# Patient Record
Sex: MALE | Race: WHITE | NOT HISPANIC OR LATINO | Employment: OTHER | URBAN - METROPOLITAN AREA
[De-identification: names, ages, dates, MRNs, and addresses within clinical notes are randomized per-mention and may not be internally consistent; named-entity substitution may affect disease eponyms.]

---

## 2017-01-05 ENCOUNTER — AMBULATORY - HEALTHEAST (OUTPATIENT)
Dept: INTERNAL MEDICINE | Facility: CLINIC | Age: 67
End: 2017-01-05

## 2017-01-05 DIAGNOSIS — B99.9 INFECTION: ICD-10-CM

## 2017-06-29 ENCOUNTER — AMBULATORY - HEALTHEAST (OUTPATIENT)
Dept: INTERNAL MEDICINE | Facility: CLINIC | Age: 67
End: 2017-06-29

## 2017-08-29 ENCOUNTER — AMBULATORY - HEALTHEAST (OUTPATIENT)
Dept: INTERNAL MEDICINE | Facility: CLINIC | Age: 67
End: 2017-08-29

## 2017-08-29 DIAGNOSIS — M54.9 BACK PAIN: ICD-10-CM

## 2017-09-25 ENCOUNTER — HOSPITAL ENCOUNTER (OUTPATIENT)
Dept: PHYSICAL MEDICINE AND REHAB | Facility: CLINIC | Age: 67
Discharge: HOME | End: 2017-09-25
Attending: PHYSICAL MEDICINE & REHABILITATION

## 2017-09-25 DIAGNOSIS — M54.16 LUMBAR RADICULAR PAIN: ICD-10-CM

## 2017-09-25 DIAGNOSIS — M79.18 MYOFASCIAL PAIN: ICD-10-CM

## 2017-09-25 DIAGNOSIS — M54.50 LUMBAR SPINE PAIN: ICD-10-CM

## 2017-09-28 ENCOUNTER — COMMUNICATION - HEALTHEAST (OUTPATIENT)
Dept: PHYSICAL MEDICINE AND REHAB | Facility: CLINIC | Age: 67
End: 2017-09-28

## 2017-09-28 DIAGNOSIS — M54.50 LUMBAR SPINE PAIN: ICD-10-CM

## 2017-10-02 ENCOUNTER — RECORDS - HEALTHEAST (OUTPATIENT)
Dept: ADMINISTRATIVE | Facility: OTHER | Age: 67
End: 2017-10-02

## 2017-10-04 ENCOUNTER — OFFICE VISIT - HEALTHEAST (OUTPATIENT)
Dept: PHYSICAL THERAPY | Facility: CLINIC | Age: 67
End: 2017-10-04

## 2017-10-04 DIAGNOSIS — M54.50 LUMBAR SPINE PAIN: ICD-10-CM

## 2017-10-09 ENCOUNTER — OFFICE VISIT - HEALTHEAST (OUTPATIENT)
Dept: PHYSICAL THERAPY | Facility: CLINIC | Age: 67
End: 2017-10-09

## 2017-10-09 DIAGNOSIS — M54.50 LUMBAR SPINE PAIN: ICD-10-CM

## 2017-10-12 ENCOUNTER — COMMUNICATION - HEALTHEAST (OUTPATIENT)
Dept: PHYSICAL MEDICINE AND REHAB | Facility: CLINIC | Age: 67
End: 2017-10-12

## 2017-10-16 ENCOUNTER — OFFICE VISIT - HEALTHEAST (OUTPATIENT)
Dept: PHYSICAL THERAPY | Facility: CLINIC | Age: 67
End: 2017-10-16

## 2017-10-16 DIAGNOSIS — M54.50 LUMBAR SPINE PAIN: ICD-10-CM

## 2017-10-20 ENCOUNTER — AMBULATORY - HEALTHEAST (OUTPATIENT)
Dept: INTERNAL MEDICINE | Facility: CLINIC | Age: 67
End: 2017-10-20

## 2017-10-20 DIAGNOSIS — G43.909 MIGRAINE: ICD-10-CM

## 2017-10-24 ENCOUNTER — AMBULATORY - HEALTHEAST (OUTPATIENT)
Dept: INTERNAL MEDICINE | Facility: CLINIC | Age: 67
End: 2017-10-24

## 2017-12-29 ENCOUNTER — AMBULATORY - HEALTHEAST (OUTPATIENT)
Dept: INTERNAL MEDICINE | Facility: CLINIC | Age: 67
End: 2017-12-29

## 2017-12-29 ENCOUNTER — AMBULATORY - HEALTHEAST (OUTPATIENT)
Dept: NURSING | Facility: CLINIC | Age: 67
End: 2017-12-29

## 2017-12-29 DIAGNOSIS — Z23 NEED FOR TDAP VACCINATION: ICD-10-CM

## 2018-02-19 ENCOUNTER — AMBULATORY - HEALTHEAST (OUTPATIENT)
Dept: INTERNAL MEDICINE | Facility: CLINIC | Age: 68
End: 2018-02-19

## 2018-02-19 DIAGNOSIS — L30.9 DERMATITIS: ICD-10-CM

## 2018-02-19 DIAGNOSIS — J11.1 INFLUENZA: ICD-10-CM

## 2018-03-22 ENCOUNTER — AMBULATORY - HEALTHEAST (OUTPATIENT)
Dept: INTERNAL MEDICINE | Facility: CLINIC | Age: 68
End: 2018-03-22

## 2018-03-22 DIAGNOSIS — E55.9 VITAMIN D DEFICIENCY: ICD-10-CM

## 2018-03-22 DIAGNOSIS — Z00.00 PREVENTATIVE HEALTH CARE: ICD-10-CM

## 2018-03-22 DIAGNOSIS — Z12.5 PROSTATE CANCER SCREENING: ICD-10-CM

## 2018-03-22 DIAGNOSIS — E78.00 PURE HYPERCHOLESTEROLEMIA: ICD-10-CM

## 2018-03-26 ENCOUNTER — COMMUNICATION - HEALTHEAST (OUTPATIENT)
Dept: TELEHEALTH | Facility: CLINIC | Age: 68
End: 2018-03-26

## 2018-03-26 ENCOUNTER — AMBULATORY - HEALTHEAST (OUTPATIENT)
Dept: LAB | Facility: CLINIC | Age: 68
End: 2018-03-26

## 2018-03-26 DIAGNOSIS — E78.00 PURE HYPERCHOLESTEROLEMIA: ICD-10-CM

## 2018-03-26 DIAGNOSIS — E55.9 VITAMIN D DEFICIENCY: ICD-10-CM

## 2018-03-26 DIAGNOSIS — Z12.5 PROSTATE CANCER SCREENING: ICD-10-CM

## 2018-03-26 LAB
ALBUMIN SERPL-MCNC: 4 G/DL (ref 3.5–5)
ALP SERPL-CCNC: 67 U/L (ref 45–120)
ALT SERPL W P-5'-P-CCNC: 26 U/L (ref 0–45)
ANION GAP SERPL CALCULATED.3IONS-SCNC: 11 MMOL/L (ref 5–18)
AST SERPL W P-5'-P-CCNC: 28 U/L (ref 0–40)
BILIRUB SERPL-MCNC: 0.7 MG/DL (ref 0–1)
BUN SERPL-MCNC: 24 MG/DL (ref 8–22)
CALCIUM SERPL-MCNC: 9.4 MG/DL (ref 8.5–10.5)
CHLORIDE BLD-SCNC: 105 MMOL/L (ref 98–107)
CHOLEST SERPL-MCNC: 188 MG/DL
CO2 SERPL-SCNC: 26 MMOL/L (ref 22–31)
CREAT SERPL-MCNC: 0.84 MG/DL (ref 0.7–1.3)
ERYTHROCYTE [DISTWIDTH] IN BLOOD BY AUTOMATED COUNT: 11.9 % (ref 11–14.5)
FASTING STATUS PATIENT QL REPORTED: YES
GFR SERPL CREATININE-BSD FRML MDRD: >60 ML/MIN/1.73M2
GLUCOSE BLD-MCNC: 105 MG/DL (ref 70–125)
HCT VFR BLD AUTO: 46.3 % (ref 40–54)
HDLC SERPL-MCNC: 83 MG/DL
HGB BLD-MCNC: 15.9 G/DL (ref 14–18)
LDLC SERPL CALC-MCNC: 92 MG/DL
MCH RBC QN AUTO: 31.3 PG (ref 27–34)
MCHC RBC AUTO-ENTMCNC: 34.4 G/DL (ref 32–36)
MCV RBC AUTO: 91 FL (ref 80–100)
PLATELET # BLD AUTO: 211 THOU/UL (ref 140–440)
PMV BLD AUTO: 7.7 FL (ref 7–10)
POTASSIUM BLD-SCNC: 4.8 MMOL/L (ref 3.5–5)
PROT SERPL-MCNC: 6.9 G/DL (ref 6–8)
PSA SERPL-MCNC: 2.5 NG/ML (ref 0–4.5)
RBC # BLD AUTO: 5.09 MILL/UL (ref 4.4–6.2)
SODIUM SERPL-SCNC: 142 MMOL/L (ref 136–145)
TRIGL SERPL-MCNC: 66 MG/DL
TSH SERPL DL<=0.005 MIU/L-ACNC: 4.06 UIU/ML (ref 0.3–5)
WBC: 4.6 THOU/UL (ref 4–11)

## 2018-03-27 LAB
25(OH)D3 SERPL-MCNC: 44.7 NG/ML (ref 30–80)
25(OH)D3 SERPL-MCNC: 44.7 NG/ML (ref 30–80)

## 2018-04-25 ENCOUNTER — AMBULATORY - HEALTHEAST (OUTPATIENT)
Dept: NURSING | Facility: CLINIC | Age: 68
End: 2018-04-25

## 2018-04-25 ENCOUNTER — AMBULATORY - HEALTHEAST (OUTPATIENT)
Dept: INTERNAL MEDICINE | Facility: CLINIC | Age: 68
End: 2018-04-25

## 2018-05-08 ENCOUNTER — AMBULATORY - HEALTHEAST (OUTPATIENT)
Dept: INTERNAL MEDICINE | Facility: CLINIC | Age: 68
End: 2018-05-08

## 2018-05-08 ENCOUNTER — AMBULATORY - HEALTHEAST (OUTPATIENT)
Dept: NURSING | Facility: CLINIC | Age: 68
End: 2018-05-08

## 2018-05-08 DIAGNOSIS — Z23 NEED FOR PNEUMOCOCCAL VACCINE: ICD-10-CM

## 2018-05-30 ENCOUNTER — OFFICE VISIT - HEALTHEAST (OUTPATIENT)
Dept: PHYSICAL THERAPY | Facility: CLINIC | Age: 68
End: 2018-05-30

## 2018-05-30 DIAGNOSIS — M54.50 LUMBAR SPINE PAIN: ICD-10-CM

## 2018-06-11 ENCOUNTER — OFFICE VISIT - HEALTHEAST (OUTPATIENT)
Dept: PHYSICAL THERAPY | Facility: CLINIC | Age: 68
End: 2018-06-11

## 2018-06-11 DIAGNOSIS — M54.50 LUMBAR SPINE PAIN: ICD-10-CM

## 2018-06-13 ENCOUNTER — OFFICE VISIT - HEALTHEAST (OUTPATIENT)
Dept: PHYSICAL THERAPY | Facility: CLINIC | Age: 68
End: 2018-06-13

## 2018-06-13 DIAGNOSIS — M54.50 LUMBAR SPINE PAIN: ICD-10-CM

## 2018-06-18 ENCOUNTER — OFFICE VISIT - HEALTHEAST (OUTPATIENT)
Dept: PHYSICAL THERAPY | Facility: CLINIC | Age: 68
End: 2018-06-18

## 2018-06-18 DIAGNOSIS — M54.50 LUMBAR SPINE PAIN: ICD-10-CM

## 2018-06-26 ENCOUNTER — AMBULATORY - HEALTHEAST (OUTPATIENT)
Dept: NURSING | Facility: CLINIC | Age: 68
End: 2018-06-26

## 2018-06-26 DIAGNOSIS — Z23 NEED FOR SHINGLES VACCINE: ICD-10-CM

## 2018-07-02 ENCOUNTER — AMBULATORY - HEALTHEAST (OUTPATIENT)
Dept: INTERNAL MEDICINE | Facility: CLINIC | Age: 68
End: 2018-07-02

## 2018-07-02 DIAGNOSIS — E78.5 HYPERLIPEMIA: ICD-10-CM

## 2018-07-25 ENCOUNTER — AMBULATORY - HEALTHEAST (OUTPATIENT)
Dept: INTERNAL MEDICINE | Facility: CLINIC | Age: 68
End: 2018-07-25

## 2018-07-25 DIAGNOSIS — M21.42 FLAT FEET, BILATERAL: ICD-10-CM

## 2018-07-25 DIAGNOSIS — M21.41 FLAT FEET, BILATERAL: ICD-10-CM

## 2018-12-04 ENCOUNTER — AMBULATORY - HEALTHEAST (OUTPATIENT)
Dept: INTERNAL MEDICINE | Facility: CLINIC | Age: 68
End: 2018-12-04

## 2018-12-04 DIAGNOSIS — L30.9 DERMATITIS: ICD-10-CM

## 2018-12-04 DIAGNOSIS — E78.5 HYPERLIPEMIA: ICD-10-CM

## 2018-12-05 ENCOUNTER — AMBULATORY - HEALTHEAST (OUTPATIENT)
Dept: INTERNAL MEDICINE | Facility: CLINIC | Age: 68
End: 2018-12-05

## 2018-12-06 ENCOUNTER — COMMUNICATION - HEALTHEAST (OUTPATIENT)
Dept: INTERNAL MEDICINE | Facility: CLINIC | Age: 68
End: 2018-12-06

## 2019-03-19 ENCOUNTER — RECORDS - HEALTHEAST (OUTPATIENT)
Dept: ADMINISTRATIVE | Facility: OTHER | Age: 69
End: 2019-03-19

## 2019-07-17 ENCOUNTER — COMMUNICATION - HEALTHEAST (OUTPATIENT)
Dept: INTERNAL MEDICINE | Facility: CLINIC | Age: 69
End: 2019-07-17

## 2019-07-25 ENCOUNTER — OFFICE VISIT - HEALTHEAST (OUTPATIENT)
Dept: INTERNAL MEDICINE | Facility: CLINIC | Age: 69
End: 2019-07-25

## 2019-07-25 DIAGNOSIS — Z12.5 PROSTATE CANCER SCREENING: ICD-10-CM

## 2019-07-25 DIAGNOSIS — Z00.00 PREVENTATIVE HEALTH CARE: ICD-10-CM

## 2019-07-25 DIAGNOSIS — E78.00 PURE HYPERCHOLESTEROLEMIA: ICD-10-CM

## 2019-07-25 LAB
ALBUMIN SERPL-MCNC: 4.6 G/DL (ref 3.5–5)
ALP SERPL-CCNC: 64 U/L (ref 45–120)
ALT SERPL W P-5'-P-CCNC: 26 U/L (ref 0–45)
ANION GAP SERPL CALCULATED.3IONS-SCNC: 13 MMOL/L (ref 5–18)
AST SERPL W P-5'-P-CCNC: 29 U/L (ref 0–40)
BILIRUB SERPL-MCNC: 0.7 MG/DL (ref 0–1)
BUN SERPL-MCNC: 19 MG/DL (ref 8–22)
CALCIUM SERPL-MCNC: 10.3 MG/DL (ref 8.5–10.5)
CHLORIDE BLD-SCNC: 106 MMOL/L (ref 98–107)
CHOLEST SERPL-MCNC: 206 MG/DL
CO2 SERPL-SCNC: 26 MMOL/L (ref 22–31)
CREAT SERPL-MCNC: 0.96 MG/DL (ref 0.7–1.3)
FASTING STATUS PATIENT QL REPORTED: YES
GFR SERPL CREATININE-BSD FRML MDRD: >60 ML/MIN/1.73M2
GLUCOSE BLD-MCNC: 115 MG/DL (ref 70–125)
HDLC SERPL-MCNC: 94 MG/DL
LDLC SERPL CALC-MCNC: 102 MG/DL
POTASSIUM BLD-SCNC: 4.9 MMOL/L (ref 3.5–5)
PROT SERPL-MCNC: 7.3 G/DL (ref 6–8)
PSA SERPL-MCNC: 1.2 NG/ML (ref 0–4.5)
SODIUM SERPL-SCNC: 145 MMOL/L (ref 136–145)
TRIGL SERPL-MCNC: 50 MG/DL

## 2019-07-25 ASSESSMENT — MIFFLIN-ST. JEOR: SCORE: 1765.32

## 2019-11-29 ENCOUNTER — AMBULATORY - HEALTHEAST (OUTPATIENT)
Dept: FAMILY MEDICINE | Facility: CLINIC | Age: 69
End: 2019-11-29

## 2019-11-29 DIAGNOSIS — R05.9 COUGH: ICD-10-CM

## 2019-12-04 ENCOUNTER — RECORDS - HEALTHEAST (OUTPATIENT)
Dept: ADMINISTRATIVE | Facility: OTHER | Age: 69
End: 2019-12-04

## 2020-01-04 ENCOUNTER — AMBULATORY - HEALTHEAST (OUTPATIENT)
Dept: FAMILY MEDICINE | Facility: CLINIC | Age: 70
End: 2020-01-04

## 2020-01-04 DIAGNOSIS — J10.1 INFLUENZA A: ICD-10-CM

## 2020-01-26 ENCOUNTER — AMBULATORY - HEALTHEAST (OUTPATIENT)
Dept: FAMILY MEDICINE | Facility: CLINIC | Age: 70
End: 2020-01-26

## 2020-01-26 DIAGNOSIS — L30.9 DERMATITIS: ICD-10-CM

## 2020-01-26 RX ORDER — TRIAMCINOLONE ACETONIDE 5 MG/G
OINTMENT TOPICAL
Qty: 60 G | Refills: 3 | Status: SHIPPED | OUTPATIENT
Start: 2020-01-26

## 2020-02-11 ENCOUNTER — AMBULATORY - HEALTHEAST (OUTPATIENT)
Dept: FAMILY MEDICINE | Facility: CLINIC | Age: 70
End: 2020-02-11

## 2020-02-11 DIAGNOSIS — J10.1 INFLUENZA A: ICD-10-CM

## 2020-09-12 ENCOUNTER — AMBULATORY - HEALTHEAST (OUTPATIENT)
Dept: INTERNAL MEDICINE | Facility: CLINIC | Age: 70
End: 2020-09-12

## 2020-09-12 DIAGNOSIS — Z11.59 NEED FOR HEPATITIS C SCREENING TEST: ICD-10-CM

## 2020-09-12 DIAGNOSIS — Z12.5 PROSTATE CANCER SCREENING: ICD-10-CM

## 2020-09-12 DIAGNOSIS — E78.00 PURE HYPERCHOLESTEROLEMIA: ICD-10-CM

## 2020-09-12 DIAGNOSIS — E55.9 VITAMIN D DEFICIENCY: ICD-10-CM

## 2020-09-16 ENCOUNTER — AMBULATORY - HEALTHEAST (OUTPATIENT)
Dept: LAB | Facility: CLINIC | Age: 70
End: 2020-09-16

## 2020-09-16 DIAGNOSIS — Z11.59 NEED FOR HEPATITIS C SCREENING TEST: ICD-10-CM

## 2020-09-16 DIAGNOSIS — Z12.5 PROSTATE CANCER SCREENING: ICD-10-CM

## 2020-09-16 DIAGNOSIS — E55.9 VITAMIN D DEFICIENCY: ICD-10-CM

## 2020-09-16 DIAGNOSIS — E78.00 PURE HYPERCHOLESTEROLEMIA: ICD-10-CM

## 2020-09-16 LAB
ALBUMIN SERPL-MCNC: 4.6 G/DL (ref 3.5–5)
ALP SERPL-CCNC: 70 U/L (ref 45–120)
ALT SERPL W P-5'-P-CCNC: 32 U/L (ref 0–45)
ANION GAP SERPL CALCULATED.3IONS-SCNC: 12 MMOL/L (ref 5–18)
AST SERPL W P-5'-P-CCNC: 30 U/L (ref 0–40)
BILIRUB SERPL-MCNC: 1 MG/DL (ref 0–1)
BUN SERPL-MCNC: 19 MG/DL (ref 8–22)
CALCIUM SERPL-MCNC: 9.8 MG/DL (ref 8.5–10.5)
CHLORIDE BLD-SCNC: 106 MMOL/L (ref 98–107)
CHOLEST SERPL-MCNC: 223 MG/DL
CO2 SERPL-SCNC: 25 MMOL/L (ref 22–31)
CREAT SERPL-MCNC: 0.96 MG/DL (ref 0.7–1.3)
FASTING STATUS PATIENT QL REPORTED: YES
GFR SERPL CREATININE-BSD FRML MDRD: >60 ML/MIN/1.73M2
GLUCOSE BLD-MCNC: 98 MG/DL (ref 70–125)
HCV AB SERPL QL IA: NEGATIVE
HDLC SERPL-MCNC: 106 MG/DL
LDLC SERPL CALC-MCNC: 107 MG/DL
POTASSIUM BLD-SCNC: 4.8 MMOL/L (ref 3.5–5)
PROT SERPL-MCNC: 7.5 G/DL (ref 6–8)
PSA SERPL-MCNC: 1.8 NG/ML (ref 0–4.5)
SODIUM SERPL-SCNC: 143 MMOL/L (ref 136–145)
TRIGL SERPL-MCNC: 50 MG/DL

## 2020-09-17 LAB
25(OH)D3 SERPL-MCNC: 33.5 NG/ML (ref 30–80)
25(OH)D3 SERPL-MCNC: 33.5 NG/ML (ref 30–80)

## 2020-10-15 ENCOUNTER — RECORDS - HEALTHEAST (OUTPATIENT)
Dept: ADMINISTRATIVE | Facility: OTHER | Age: 70
End: 2020-10-15

## 2020-11-16 ENCOUNTER — AMBULATORY - HEALTHEAST (OUTPATIENT)
Dept: INTERNAL MEDICINE | Facility: CLINIC | Age: 70
End: 2020-11-16

## 2020-11-16 DIAGNOSIS — E78.00 PURE HYPERCHOLESTEROLEMIA: ICD-10-CM

## 2020-12-23 ENCOUNTER — AMBULATORY - HEALTHEAST (OUTPATIENT)
Dept: INTERNAL MEDICINE | Facility: CLINIC | Age: 70
End: 2020-12-23

## 2020-12-23 DIAGNOSIS — E78.00 PURE HYPERCHOLESTEROLEMIA: ICD-10-CM

## 2020-12-23 DIAGNOSIS — E55.9 VITAMIN D DEFICIENCY: ICD-10-CM

## 2020-12-23 RX ORDER — ATORVASTATIN CALCIUM 40 MG/1
40 TABLET, FILM COATED ORAL AT BEDTIME
Qty: 90 TABLET | Refills: 3 | Status: SHIPPED | OUTPATIENT
Start: 2020-12-23 | End: 2021-11-29

## 2021-05-25 ENCOUNTER — RECORDS - HEALTHEAST (OUTPATIENT)
Dept: ADMINISTRATIVE | Facility: CLINIC | Age: 71
End: 2021-05-25

## 2021-05-28 ENCOUNTER — RECORDS - HEALTHEAST (OUTPATIENT)
Dept: ADMINISTRATIVE | Facility: CLINIC | Age: 71
End: 2021-05-28

## 2021-05-29 ENCOUNTER — RECORDS - HEALTHEAST (OUTPATIENT)
Dept: ADMINISTRATIVE | Facility: CLINIC | Age: 71
End: 2021-05-29

## 2021-05-30 NOTE — TELEPHONE ENCOUNTER
New Appointment Needed  What is the reason for the visit:    cholesterol blood and medication check, patient has not had an appointment in the last 3 years and that makes the patient new again. Patient is wondering if  Dr. Lockwood will still be willing to see them.  Provider Preference: PCP only  How soon do you need to be seen?: as available, patient said that there is no rush  Waitlist offered?: No  Okay to leave a detailed message:  Yes

## 2021-05-30 NOTE — PROGRESS NOTES
ASSESSMENT:  1. Pure hypercholesterolemia  Occasional myalgias but none recently.  Refill Lipitor.  Check labs.  Discussed aspirin usage  - Lipid Cascade  - Comprehensive Metabolic Panel  - atorvastatin (LIPITOR) 40 MG tablet; Take 1 tablet (40 mg total) by mouth at bedtime.  Dispense: 90 tablet; Refill: 3    2. Prostate cancer screening  Routine update  - PSA (Prostatic-Specific Antigen), Annual Screen    3. Preventative health care  Discussed HIV and hepatitis C which he defers.  Update Pneumovax.  Tdap up-to-date.:  Up-to-date  - Pneumococcal polysaccharide vaccine 23-valent greater than or equal to 1yo subcutaneous/IM      Otherwise doing very well    PLAN:  Patient Instructions   Update pneumovax    Update labs    Refill lipitor    Shots up to date          Orders Placed This Encounter   Procedures     Pneumococcal polysaccharide vaccine 23-valent greater than or equal to 1yo subcutaneous/IM     Lipid Cascade     Order Specific Question:   Fasting is required?     Answer:   Unknown     Comprehensive Metabolic Panel     PSA (Prostatic-Specific Antigen), Annual Screen     Medications Discontinued During This Encounter   Medication Reason     atorvastatin (LIPITOR) 40 MG tablet        Return in about 1 year (around 7/25/2020) for Next scheduled follow up.    CHIEF COMPLAINT:  Chief Complaint   Patient presents with     Medication Management       HISTORY OF PRESENT ILLNESS:  Mike is a 68 y.o. male presenting to the clinic today for medication review.  He takes atorvastatin for primary cardiac prevention.  He occasionally will feel muscle aches but has not felt any for the last several months.  He denies chest pain or shortness of breath    Preventive care screening is reviewed.  He reports up-to-date with colonoscopy.  No prostate symptoms.  New recommendations for HIV discussed and he defers.  He is not interested in hepatitis C screening.  Liver function tests have been normal in the past    REVIEW OF SYSTEMS:  "  No chest pain or shortness of breath.  Occasional myalgias.  Good energy.  Good mood.  All other systems are negative.    PFSH:  Retired in March from [place] [facility].  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  Expecting third grandchild    TOBACCO USE:  Social History     Tobacco Use   Smoking Status Never Smoker   Smokeless Tobacco Never Used       VITALS:  Vitals:    07/25/19 0912   BP: 118/74   Patient Site: Left Arm   Patient Position: Sitting   Cuff Size: Adult Regular   Pulse: 66   SpO2: 98%   Weight: 202 lb 4.8 oz (91.8 kg)   Height: 6' 2.5\" (1.892 m)     Wt Readings from Last 3 Encounters:   07/25/19 202 lb 4.8 oz (91.8 kg)   09/25/17 204 lb (92.5 kg)     Body mass index is 25.63 kg/m .    PHYSICAL EXAM:  Constitutional:  Reveals a pleasant alert energetic man.  Vitals:  Per nursing notes.    Cardiac: Regular rate and rhythm without murmurs, rubs, or gallops.     Legs thick ankles but no edema  Palpation of the radial pulse regular.  Lungs: Clear.  Respiratory effort normal.    Neurologic:  Cranial nerves II-XII intact.     Psychiatric:  Mood appropriate, memory intact.       ADDITIONAL HISTORY SUMMARIZED (2):   CARE EVERYWHERE/ EXTRA INFORMATION (1): None.   RADIOLOGY TESTS (1):   LABS (1): Ordered today  CARDIOLOGY/MEDICINE TESTS (1): Calcium score increased recently  INDEPENDENT REVIEW (2 each): None.     Total data points:2      The visit lasted a total of 20 minutes face to face with the patient. Over 50% of the time was spent counseling and educating the patient about medications, medication adjustments, medication side effects, and lab testing.        MEDICATIONS:  Current Outpatient Medications   Medication Sig Dispense Refill     aspirin 81 MG EC tablet Take 81 mg by mouth daily.       atorvastatin (LIPITOR) 40 MG tablet Take 1 tablet (40 mg total) by mouth at bedtime. 90 tablet 3     cholecalciferol, vitamin D3, (VITAMIN D3) 2,000 unit Tab Take 1 tablet (2,000 Units " total) by mouth daily.  0     No current facility-administered medications for this visit.

## 2021-05-30 NOTE — TELEPHONE ENCOUNTER
Left pt detailed message that he had been scheduled with Dr. Lockwood form 7/24/19 at 11:30am for appt.  Advised pt to call back with further questions.

## 2021-05-31 VITALS — WEIGHT: 204 LBS

## 2021-06-01 ENCOUNTER — RECORDS - HEALTHEAST (OUTPATIENT)
Dept: ADMINISTRATIVE | Facility: CLINIC | Age: 71
End: 2021-06-01

## 2021-06-03 VITALS — HEIGHT: 75 IN | WEIGHT: 202.3 LBS | BODY MASS INDEX: 25.15 KG/M2

## 2021-06-08 ENCOUNTER — RECORDS - HEALTHEAST (OUTPATIENT)
Dept: LAB | Facility: CLINIC | Age: 71
End: 2021-06-08

## 2021-06-08 DIAGNOSIS — M54.50 LOW BACK PAIN: ICD-10-CM

## 2021-06-13 NOTE — PROGRESS NOTES
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 Lumbar spine pain M54.5        Assessment:     Pt demonstrated significant weakness per MedX testing.  He tolerated exercises well.  Despite subjective reports of radicular sx yesterday today these have resolved and neurodynamic testing is negative BLE.    Patient is benefitting from skilled physical therapy and is making steady progress toward functional goals.  Patient is appropriate to continue with skilled physical therapy intervention, as indicated by initial plan of care.    Goal Status:  Pt. will demonstrate/verbalize independence in self-management of condition in : 4 weeks  Pt. will be independent with home exercise program in : 4 weeks  Pt will: demonstrate improved forward flexion without sx in 4 weeks  Pt will:  improved deep squat without heel rise in 4 weeks  Pt will: demonstrate MedX lumbar strength per IM within age matched average in 4 weeks    Plan / Patient Education:     Continue with initial plan of care.  Progress with home program as tolerated.   Next visit: MedX DE,     Subjective:     Pain Rating: Not rated today  Pt reports he had pain into his leg yesterday, radiating in an L4-5 pattern.  This has since resolved and he reports today he is relatively pain free even in his golf stance when loading LLE.      Objective:     MED X Testing Lumbar Initial - 4 week re-test -   AROM (full ROM 0-72) 0-72    Max Torque  186#    Avg Torque  112#    Flex/ext Ratio (ideal 1.4:1) 9.30:1      Exercises: (See flowsheet for dates performed)  Exercise #1: Treadmill 3.4 mph 5'  Comment #1: Rotary torso 26# started to R, 15x dagmar  Exercise #2: Fany prone on elbows - modified due to right medial epicondylitis  Comment #2: Piriformis modified  "45\"  Exercise #3: Piriformis fig 4 stretch 45\"      Treatment Today     TREATMENT MINUTES COMMENTS   Evaluation     Self-care/ Home management     Manual therapy     Neuromuscular Re-education     Therapeutic Activity     Therapeutic Exercises 30 MEDX IM, DE, see flowsheet   Gait training     Modality__________________                Total 30    Blank areas are intentional and mean the treatment did not include these items.       Juana Florentino  10/9/2017      "

## 2021-06-13 NOTE — PROGRESS NOTES
[facility] Discharge Summary  Patient Name: Mike Ackerman MD  Date: 1/23/2018  Referral Diagnosis: Lumbar spine pain [M54.5]  - Primary   MedX  Referring provider: Caleb Villalba DOVisit Diagnosis:   1. Lumbar spine pain       Goal Status:  Pt. will demonstrate/verbalize independence in self-management of condition in : 4 weeks  Pt. will be independent with home exercise program in : 4 weeks  Pt will: demonstrate improved forward flexion without sx in 4 weeks  Pt will:  improved deep squat without heel rise in 4 weeks  Pt will: demonstrate MedX lumbar strength per IM within age matched average in 4 weeks    Patient was seen for 3 visits from 10/4/17 to 10/16/17 with 0 missed appointments.  At last session pt continued to have radicular sx but low back pain was improving.  Pt did not schedule further appointments and will be discharged at this time.    Therapy will be discontinued at this time.  The patient will need a new referral to resume.    Thank you for your referral.  Juana Florentino, DPT  1/23/2018  9:27 AM      [facility] Daily Progress     Patient Name: Mike Ackerman MD  Date: 10/16/2017  Visit #: 3  PTA visit #:  -  Referral Diagnosis: Lumbar spine pain [M54.5]  - Primary   MedX  Referring provider: Caleb Villalba DO  Visit Diagnosis:     ICD-10-CM    1. Lumbar spine pain M54.5        Assessment:     Pt reports increased radicular pain but improving low back pain.  Radicular pain may still follow L5 dermatomal pattern however does not go into the foot at this time.  Pt educated on neuromobilizations, several times a day as well as some piriformis stretches/strengthening.  No significant change in sx during session today.  May review Fany extensions to look for centralization or other directional preference.    Patient is benefitting from skilled physical therapy and is making steady progress toward functional goals.  Patient is appropriate to continue with skilled  physical therapy intervention, as indicated by initial plan of care.    Goal Status:  Pt. will demonstrate/verbalize independence in self-management of condition in : 4 weeks  Pt. will be independent with home exercise program in : 4 weeks  Pt will: demonstrate improved forward flexion without sx in 4 weeks  Pt will:  improved deep squat without heel rise in 4 weeks  Pt will: demonstrate MedX lumbar strength per IM within age matched average in 4 weeks    Plan / Patient Education:     Continue with initial plan of care.  Progress with home program as tolerated.   Next visit: MedX DE, review HEP    Subjective:     Pain Rating: Not rated today  Pt reports radicular sx though his back pain seems to have resolved.  He reports some gluteal pain, lateral leg and lower leg tingling and pain including worse with SLR and SLUMP test. Denies N/T in his foot today.      Objective:     MED X Testing Lumbar Initial - 4 week re-test -   AROM (full ROM 0-72) 0-72    Max Torque  186#    Avg Torque  112#    Flex/ext Ratio (ideal 1.4:1) 9.30:1      Exercises: (See flowsheet for dates performed)  Exercise #1: Treadmill 3.4 mph 5'  Comment #1: Rotary torso 26# started to R, 15x dagmar  Exercise #2: Fany prone on elbows - modified due to right medial epicondylitis  Comment #2: Piriformis modified   Exercise #3: Piriformis fig 4 stretch   Comment #3: Reviewed  Exercise #4: Clamshell  Comment #4: 10-15x dagmar  Exercise #5: SLUMP Sliders  Comment #5: 15x dagmar, supine and seated      Treatment Today     TREATMENT MINUTES COMMENTS   Evaluation     Self-care/ Home management     Manual therapy     Neuromuscular Re-education     Therapeutic Activity     Therapeutic Exercises 27 MEDX IM, DE, see flowsheet   Gait training     Modality__________________                Total 27    Blank areas are intentional and mean the treatment did not include these items.       Juana Florentino DPT  10/16/2017

## 2021-06-13 NOTE — PROGRESS NOTES
[facility]   [facility]/Cervico-Thoracic Initial Evaluation    Patient Name: Mike Ackerman MD  Date of evaluation: 10/4/2017  Referral Diagnosis: Lumbar spine pain [M54.5]  - Primary   MedX  Referring provider: Caleb Villalba DO  Visit Diagnosis:     ICD-10-CM    1. 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  Pt sx began worsening 3-4 weeks ago, believed MAYCOL is increasing core exercises.  He has done well treating his back pain in the past conservatively.  Upon musculoskeletal exam pt demonstrates decreased flexibility of lumbar and LE bilaterally, leg length discrepancy (left leg longer ~1/2 cm), neuro exam negative.  Pt is most functionally limited with golf.  Pt would benefit from skilled PT to improve flexibility, ROM, strength, MedX.  Pt POC and goals was developed in coordination with patient.    Goals:  Pt. will demonstrate/verbalize independence in self-management of condition in : 4 weeks  Pt. will be independent with home exercise program in : 4 weeks  Pt will: demonstrate improved forward flexion without sx in 4 weeks  Pt will:  improved deep squat without heel rise in 4 weeks  Pt will: demonstrate MedX lumbar strength per IM within age matched average in 4 weeks    Patient's expectations/goals are realistic.    Barriers to Learning or Achieving Goals:  No Barriers.        Plan / Patient Instructions:        Plan of Care:   Communication with: Referral Source  Patient Related Instruction: Nature of Condition;Body mechanics;Treatment plan and rationale;Posture;Self Care instruction;Basis of treatment  Times per Week: 1-2  Number of Weeks: 8-12  Number of Visits: 16  Discharge Planning: met PT goals or plateau of progress and indep with HEP  Therapeutic Exercise: ROM;Stretching;Strengthening;Other  Therapeutic Exercise : MedX  Neuromuscular  "Reeducation: posture;core;kinesio tape  Manual Therapy: soft tissue mobilization;myofascial release;joint mobilization;muscle energy  Modalities: cold pack;hot pack;other;TENS  Modalities: PRN    Plan for next visit: MedX Lumbar IM, rotary torso,      Subjective:       Social information:   Occupation: MD, internal medicine   Work Status:Working full time    History of Present Illness:    Mike is a 66 y.o. male who presents to therapy today with complaints of left sided low back pain.  He had significant flare up 3 years ago when picking up a golf herminia.  He has treated things conservatively, including exercises, foam roll, . Sx again flared up several weeks ago.  He did some ball exercises and when bearing down seemed to flare up.  He has only been able to play golf once or twice and this aggravates sx a lot.  He has some radiating sx to near the groin/quad area.    Routine: foam rolls back, butterfly stretch, \"dirty dog\" exercise    Previous Activities Enjoyed: Golf     Pt has a history o.  Pt goal is get back to golfing.    Pain Ratin  Pain rating at best: 1  Pain description: soreness and spasms    Functional limitations are described as occurring with:   Golf           Objective:      Note: Items left blank indicates the item was not performed or not indicated at the time of the evaluation.    Patient Outcome Measures :    Modified Oswestry Low Back Pain Disablity Questionnaire  in %: 8   Scores range from 0-100%, where a score of 0% represents minimal pain and maximal function. The minimal clinically important difference is a score reduction of 12%.    Examination  1. Lumbar spine pain       Precautions/Restrictions: None    Posture Observation: Generally WNL    Lumbar ROM:    Date: 10/4/2017     *Indicate scale AROM AROM AROM   Lumbar Flexion 31 cm     Lumbar Extension WNL, effort      Right Left Right Left Right Left   Lumbar Sidebending To knee To knee + pn       Lumbar Rotation WNL WNL         Lower " Extremity Strength:     Date: 10/4/2017     LE strength/5 Right Left Right Left Right Left   Hip Flexion (L1-3) 5 4+       Hip Extension (L5-S1)         Hip Abduction (L4-5)         Hip Adduction (L2-3)         Hip External Rotation         Hip Internal Rotation         Knee Extension (L3-4) 5 5       Knee Flexion         Ankle Dorsiflexion (L4-5) 5 5       Great Toe Extension (L5) 5 5       Ankle Plantar flexion (S1) 5 5       Abdominals        Lumbar Sensation    Denies N/T, not officially tested    Palpation: Minimal tenderness.  Some localized to left PSIS area.      Lumbar Special Tests:     Lumbar Special Tests Right Left SI Tests Right  Left   Quadrant test   SI Compression     Slump - - SI Distraction     Crossover response   POSH Test     SLR - 70 - 70 Sacral Thrust     Sit-up test  YANCY - - mildly limited   Trunk extensor endurance test  FADIR - mildly limited -   Prone instability test  Margarito's - -   Pubic shotgun  Keagan test         Repeated Motion Testing:  Does not centralize  Does not peripheralize     Passive Mobility - Joint Integrity:  Hypomobile throughout lumbar/thoracic PAs, mild sx at lower lumbar spine    LE Screen:  Prone hip IR/ER:  Right hip IR in supine is limited to neutral and reproduces sx at the left PSIS.  Prone R hip IR limited.         Treatment Today     TREATMENT MINUTES COMMENTS   Evaluation 35 Low complexity LBP   Self-care/ Home management     Manual therapy     Neuromuscular Re-education     Therapeutic Activity     Therapeutic Exercises 15 Fany introduced, piriformis stretches.  Written instructions given with pictures.  MedX program explained   Gait training     Modality__________________                Total 50    Blank areas are intentional and mean the treatment did not include these items.        Juana Florentino DPT  10/4/2017  11:35 AM

## 2021-06-13 NOTE — PROGRESS NOTES
Assessment/Plan:      Diagnoses and all orders for this visit:    Lumbar radicular pain  -     Cancel: MR Lumbar Spine Without Contrast; Future; Expected date: 9/25/17  -     MR Lumbar Spine Without Contrast; Future; Expected date: 9/25/17    Lumbar spine pain  -     Cancel: MR Lumbar Spine Without Contrast; Future; Expected date: 9/25/17  -     MR Lumbar Spine Without Contrast; Future; Expected date: 9/25/17    Myofascial pain  -     Cancel: MR Lumbar Spine Without Contrast; Future; Expected date: 9/25/17        Assessment: Pleasant otherwise healthy 66-year-old gentleman with:    1.  Three-year history of intermittent left-sided lumbar spine and gluteal pain recent flare over the past several weeks with golfing.  He has a significant left pelvic shift and I suspect proximal left lumbar radiculopathy.  2.  Component of gluteal myofascial pain.      Discussion:    1.  Discussed the diagnoses and treatment options..  Discussed the options of further imaging along with medications and therapy.  2.  MRI lumbar spine to evaluate.  3.  Will contact by phone with results of imaging and further recommendations.      It was our pleasure caring for your patient today, if there any questions or concerns please do not hesitate to contact us.      Subjective:   Patient ID: Mike Ackerman is a 66 y.o. male.    History of Present Illness:  Patient presents for evaluation of left lumbar spine and gluteal pain into the left groin and anterior thigh.  This started approximately  3 years ago.  Change his golf swing after taking lessons and was doing fairly well but did have a pulling in the left side of the low back.  Located at the PSIS region SI joint and the gluteal region.  Did some activity modifications with canceling some golf tournaments and over time did better.  It seems to wax and wane but over the past couple of weeks also had significant flare of his pain.  Over the left PSIS and SI region gluteal region groin thigh.   This was after he worked a full day and then sat down.  Felt a significant pain in the left lumbar spine.  Has tried conservative treatments of prednisone anti-inflammatories TENS unit home exercises.  Did try to play golf with prednisone on board and did okay but still has significant pain.  It is slowly improving but still limiting him and is unable to play golf.  Worse with flexing forward and particularly with swinging a golf club.  Does have some benefit with medications.  No radiation distally into the leg numbness tingling weakness bowel or bladder incontinence.    Imaging: None available    Review of Systems: Denies numbness tingling weakness in the left leg.  No bowel or bladder incontinence.  Sleeping okay. Remainder of 12 point review systems negative unless listed above.    No past medical history on file.    The following portions of the patient's history were reviewed and updated as appropriate: allergies, current medications, past family history, past medical history, past social history, past surgical history and problem list.      WHO 5: 25    CRIS Score: 10      Objective:   Physical Exam:    Vitals:    09/25/17 1155   BP: 139/75   Pulse: 66       General:  Well-appearing male in no acute distress.  Pleasant, cooperative, and interactive throughout the examination and interview.  CV: No lower extremity edema on inspection or paltation.  Lymphatics: No cervical lymphadenopathy palpated. Eyes: sclera clear. Skin: No rashes or lesions seen .  Respirations unlabored.  MSK: Gait is normal.  Able to heel-toe walk without difficulty.  Negative Romberg.  Spine: Slight right lumbar scoliotic curve with a left pelvic shift.  Full range of motion in the Lumbar spine in all planes.  Palpation: Tenderness to palpation over *left PSIS mildly and proximal SI joint.  Mild tenderness over the piriformis   but most significant tenderness over the left sciatic notch Extremities: Full range of motion of the elbows, and  wrists with no effusions or tenderness to palpation.   Full range of motion of the hips, knees, and ankles with no effusions or tenderness to palpation.  Negative scour maneuver and Roberto Carlos's test bilaterally for significant SI pain but does have some mild left lumbar pain with Roberto Carlos's testing.  No hypermobility of the upper or lower extremities.  Neurologic exam: Mental status: Patient is alert and oriented with normal affect.  Attention, knowledge, memory, and language are intact.  Normal coordination throughout the examination.  Reflexes are 2+ and symmetric biceps, triceps, brachioradialis, patellar, and 2+ right, 1+ left Achilles with  Negative Susana's.  Sensation is intact to light touch throughout the upper and lower extremities bilaterally.  Manual muscle testing reveals 5 out of 5 in the hip flexors, knee flexors/extensors, ankle plantar flexors, ankle  dorsiflexors, and EHL.  Upper extremities: Grossly normal strength . Normal muscle bulk and tone in the arms and legs.    Negative seated and supine straight leg raise bilaterally.  Significantly tight hamstrings left versus right.

## 2021-06-18 NOTE — PROGRESS NOTES
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 Lumbar spine pain            Goal Status:  Pt. will demonstrate/verbalize independence in self-management of condition in : 4 weeks  Pt. will be independent with home exercise program in : 4 weeks  Pt will: demonstrate increased strength per MedX testing by 30# average to WNL in 4 weeks    Patient was seen for 4 visits from 5/30/18 to 6/18/18 with 0 missed appointments.  The patient discontinued therapy, did not return.    Therapy will be discontinued at this time.  The patient will need a new referral to resume.    Thank you for your referral.  Juana Florentino, DPT  9/20/2018  10:15 AM      [facility] Daily Progress     Patient Name: Mike Ackerman MD  Date: 6/18/2018  Visit #: 4  PTA visit #:  -  Referral Diagnosis: Lumbar spine pain  Referring provider: Caleb Villalba DO  Visit Diagnosis:     ICD-10-CM    1. Lumbar spine pain M54.5        Assessment:     Mike is tolerating MedX strengthening well and appropriate to continue per POC.    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  Upon musculoskeletal exam pt demonstrates some minor hypomobility of hip IR, decreased left trunk sidebending and right trunk rotation, neuro exam negative, weakness per MedX Lumbar testing most notably in trunk extension.  Pt would benefit from skilled PT for the MedX program, increase hip mobility, core strengthening.      Goal Status:  Pt. will demonstrate/verbalize independence in self-management of condition in : 4 weeks  Pt. will be independent with home exercise program in : 4 weeks  Pt will: demonstrate increased strength per MedX testing by 30# average to WNL in 4 weeks    Plan / Patient Education:     Continue with  initial plan of care.  Progress with home program as tolerated.   Next visit: Continue 3-4 more sessions, re-test.    Subjective:     Pain Rating: None currently  Was able to hit a bucket of balls and felt good.  Admits to missing his exercises for a while.      Objective:     MedX week 2    MED X Testing Lumbar Initial - 4 week re-test -   AROM (full ROM 0-72) 0-66    Max Torque  248#    Avg Torque  173#    Flex/ext Ratio (ideal 1.4:1) 4.20:1      Exercises:  See FS for dates performed  Exercise #1: Treadmill 3.5 mph 5'  Comment #1: Rotary torso 30# started to L 10x, 34# 10x  Exercise #2: Hamstring 3D stretches dagmar reviewed  Comment #2: Hip flexor 3D stretches dagmar reviewed      Treatment Today     TREATMENT MINUTES COMMENTS   Evaluation     Self-care/ Home management     Manual therapy 10 Prone:  - Hip anterior mobs with hip ER  - Adduction/IR stretch  - Quad stretches dagmar   Neuromuscular Re-education     Therapeutic Activity     Therapeutic Exercises 15 See above   Gait training     Modality__________________                Total 28    Blank areas are intentional and mean the treatment did not include these items.       Juana Florentino, DPT  6/18/2018

## 2021-06-18 NOTE — PROGRESS NOTES
[facility]   [facility] Initial Evaluation    Patient Name: Mike Ackerman MD  Date of evaluation: 5/30/2018  Referral Diagnosis: Lumbar spine pain  Referring provider: Caleb Villalba DO  Visit Diagnosis:     ICD-10-CM    1. 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  Upon musculoskeletal exam pt demonstrates some minor hypomobility of hip IR, decreased left trunk sidebending and right trunk rotation, neuro exam negative, weakness per MedX Lumbar testing most notably in trunk extension.  Pt would benefit from skilled PT for the MedX program, increase hip mobility, core strengthening.      Goals and POC were discussed with and agreed upon with patient.    Goals:  Pt. will demonstrate/verbalize independence in self-management of condition in : 4 weeks  Pt. will be independent with home exercise program in : 4 weeks  Pt will: demonstrate increased strength per MedX testing by 30# average to WNL in 4 weeks    Patient's expectations/goals are realistic.    Barriers to Learning or Achieving Goals:  Chronicity of the problem.        Plan / Patient Instructions:        Plan of Care:   Communication with: Referral Source  Patient Related Instruction: Nature of Condition;Body mechanics;Treatment plan and rationale;Posture;Self Care instruction;Basis of treatment  Times per Week: 1-2  Number of Weeks: 8-12  Number of Visits: 16  Discharge Planning: Met PT goals  Therapeutic Exercise: ROM;Stretching;Strengthening;Other  Therapeutic Exercise : MedX  Neuromuscular Reeducation: posture;core  Manual Therapy: soft tissue mobilization;myofascial release;joint mobilization;muscle energy  Modalities: cold pack;hot pack    Plan for next visit: MedX DE, rotary torso, introduce hip 3D stretching, continue core exercises on pilates  reformer     Subjective:         Social information:   Occupation: MD, [employer] physician    History of Present Illness:    Mike is a 67 y.o. male who presents to therapy today with complaints of acute on chronic low back pain when he golfs. Date of onset/duration of symptoms is at a driving range, acute since 2017.  Symptoms are intermittent and not improving.     Previous Activity Level: Golfs a lot, goes to the gym    Pain Ratin  Pain rating at best: 0  Pain rating at worst: 7  Pain description: sharp, left side hip    Functional limitations are described as occurring with:   Golf    Sx improved with rest.         Objective:      Note: Items left blank indicates the item was not performed or not indicated at the time of the evaluation.    Patient Outcome Measures :    Modified Oswestry Low Back Pain Disablity Questionnaire  in %: 0   Scores range from 0-100%, where a score of 0% represents minimal pain and maximal function. The minimal clinically important difference is a score reduction of 12%.    Examination  1. Lumbar spine pain  Ambulatory referral to PT/OT     Precautions/Restrictions: None    Posture Observation: Fair, slouches in chair, crosses legs    Lumbar ROM:    Date: 2018     *Indicate scale AROM AROM AROM   Lumbar Flexion 23 cm     Lumbar Extension WNL      Right Left Right Left Right Left   Lumbar Sidebending To knee To knee +PN       Lumbar Rotation Min lam +PN WNL         Lower Extremity Strength:     Date: 2018     LE strength/5 Right Left Right Left Right Left   Hip Flexion (L1-3) 5 5       Hip Extension (L5-S1)         Hip Abduction (L4-5)         Hip Adduction (L2-3)         Hip External Rotation         Hip Internal Rotation         Knee Extension (L3-4) 5 5       Knee Flexion 5 5       Ankle Dorsiflexion (L4-5) 5 5       Great Toe Extension (L5) 5 5       Ankle Plantar flexion (S1) 5 5       Abdominals        Lumbar Sensation    Denies N/T      Reflex Testing  Lumbar  Dermatomes Right Left UE Reflexes Right Left   Iliac Crest and Groin (L1)   Biceps (C5-6)     Anterior Medial Thigh (L2)   Brachioradialis (C5-6)     Anterior Thigh, Medial Epicondyle Femur (L3)   Triceps (C7-8)     Lateral Thigh, Anterior Knee, Medial Leg/Malleolus (L4)   Susana s test     Lateral Leg, Dorsal Foot (L5)   LE Reflexes     Lateral Foot (S1)   Patellar (L3-4)     Posterior Leg (S2)   Achilles (S1-2)     Other:   Clonus         Palpation: No tenderness to palpation lumbar spine      Lumbar Special Tests:     Lumbar Special Tests Right Left SI Tests Right  Left   Quadrant test   SI Compression     Slump - - SI Distraction     Crossover response   POSH Test     SLR 65 60 Sacral Thrust     Sit-up test  YANCY - -   Trunk extensor endurance test  FADIR - -   Prone instability test  Margarito's - -   Pubic shotgun  Keagan test         Repeated Motion Testing:  Not tested    Passive Mobility - Joint Integrity:  Generally hypomobile PA's    LE Screen:  Generally hypomobile hips    LE Screen:  From Previous Prone hip IR/ER:  Right hip IR in supine is limited to neutral and reproduces sx at the left PSIS.  Prone R hip IR limited.       Treatment Today     TREATMENT MINUTES COMMENTS   Evaluation 35 Low complexity lumbar spine eval   Self-care/ Home management     Manual therapy     Neuromuscular Re-education     Therapeutic Activity     Therapeutic Exercises 17 MedX IM, reviewed what patient is currently working on, minor modification   Gait training     Modality__________________                Total 52    Blank areas are intentional and mean the treatment did not include these items.              Juana Florentino DPT  5/30/2018  2:33 PM

## 2021-06-18 NOTE — PROGRESS NOTES
[facility] Daily Progress     Patient Name: Mike Ackerman MD  Date: 6/11/2018  Visit #: 2  PTA visit #:  -  Referral Diagnosis: Lumbar spine pain  Referring provider: Caleb Villalba DO  Visit Diagnosis:     ICD-10-CM    1. Lumbar spine pain M54.5        Assessment:     Mike is tolerating MedX strengthening well and appropriate to continue per POC.    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  Upon musculoskeletal exam pt demonstrates some minor hypomobility of hip IR, decreased left trunk sidebending and right trunk rotation, neuro exam negative, weakness per MedX Lumbar testing most notably in trunk extension.  Pt would benefit from skilled PT for the MedX program, increase hip mobility, core strengthening.      Goal Status:  Pt. will demonstrate/verbalize independence in self-management of condition in : 4 weeks  Pt. will be independent with home exercise program in : 4 weeks  Pt will: demonstrate increased strength per MedX testing by 30# average to WNL in 4 weeks    Plan / Patient Education:     Continue with initial plan of care.  Progress with home program as tolerated.   Next visit: Continue to progress rotary torso, review stretches if needed.  May trial manual right hip IR stretch.    Subjective:     Pain Rating: Mild/intermittent  Going to try to golf this week and see how it goes.      Objective:     MedX week 1    MED X Testing Lumbar Initial - 4 week re-test -   AROM (full ROM 0-72) 0-66    Max Torque  248#    Avg Torque  173#    Flex/ext Ratio (ideal 1.4:1) 4.20:1      Exercises:  Exercise #1: Treadmill 3.5 mph 5'  Comment #1: Rotary torso 26# started to R 10x, 30# 5x  Exercise #2: Hamstring 3D stretches dagmar  Comment #2: Hip flexor 3D stretches dagmar      Treatment Today     TREATMENT MINUTES COMMENTS   Evaluation     Self-care/ Home management     Manual therapy     Neuromuscular  Re-education     Therapeutic Activity     Therapeutic Exercises 25    Gait training     Modality__________________                Total 25    Blank areas are intentional and mean the treatment did not include these items.       Juana Florentino DPT  6/11/2018

## 2021-06-18 NOTE — PROGRESS NOTES
[facility] Daily Progress     Patient Name: Mike Ackerman MD  Date: 6/13/2018  Visit #: 3  PTA visit #:  -  Referral Diagnosis: Lumbar spine pain  Referring provider: Caleb Villalba DO  Visit Diagnosis:     ICD-10-CM    1. Lumbar spine pain M54.5        Assessment:     Mike is tolerating MedX strengthening well and appropriate to continue per POC.    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  Upon musculoskeletal exam pt demonstrates some minor hypomobility of hip IR, decreased left trunk sidebending and right trunk rotation, neuro exam negative, weakness per MedX Lumbar testing most notably in trunk extension.  Pt would benefit from skilled PT for the MedX program, increase hip mobility, core strengthening.      Goal Status:  Pt. will demonstrate/verbalize independence in self-management of condition in : 4 weeks  Pt. will be independent with home exercise program in : 4 weeks  Pt will: demonstrate increased strength per MedX testing by 30# average to WNL in 4 weeks    Plan / Patient Education:     Continue with initial plan of care.  Progress with home program as tolerated.   Next visit: Continue to progress rotary torso, review stretches if needed.  May trial manual right hip IR stretch.    Subjective:     Pain Rating: Mild/intermittent  Did hit a bucket of balls after last visit.  He felt a little groin strain but nothing significant, improved with time.  Felt like the stretches last session were useful.      Objective:     MedX week 2    MED X Testing Lumbar Initial - 4 week re-test -   AROM (full ROM 0-72) 0-66    Max Torque  248#    Avg Torque  173#    Flex/ext Ratio (ideal 1.4:1) 4.20:1      Exercises:  Exercise #1: Treadmill 3.5 mph 5'  Comment #1: Rotary torso 26# started to R 10x, 30# 5x  Exercise #2: Hamstring 3D stretches dagmar reviewed  Comment #2: Hip flexor 3D stretches dagmar  reviewed      Treatment Today     TREATMENT MINUTES COMMENTS   Evaluation     Self-care/ Home management     Manual therapy 5    Neuromuscular Re-education     Therapeutic Activity     Therapeutic Exercises 23    Gait training     Modality__________________                Total 28    Blank areas are intentional and mean the treatment did not include these items.       Juana Florentino, URKHSANAT  6/13/2018

## 2021-06-27 ENCOUNTER — HEALTH MAINTENANCE LETTER (OUTPATIENT)
Age: 71
End: 2021-06-27

## 2021-10-17 ENCOUNTER — HEALTH MAINTENANCE LETTER (OUTPATIENT)
Age: 71
End: 2021-10-17

## 2021-11-29 ENCOUNTER — TELEPHONE (OUTPATIENT)
Dept: INTERNAL MEDICINE | Facility: CLINIC | Age: 71
End: 2021-11-29
Payer: COMMERCIAL

## 2021-11-29 DIAGNOSIS — E55.9 VITAMIN D DEFICIENCY: Primary | ICD-10-CM

## 2021-11-29 DIAGNOSIS — Z12.5 PROSTATE CANCER SCREENING: ICD-10-CM

## 2021-11-29 DIAGNOSIS — Z00.00 PREVENTATIVE HEALTH CARE: ICD-10-CM

## 2021-11-29 DIAGNOSIS — E78.00 PURE HYPERCHOLESTEROLEMIA: ICD-10-CM

## 2021-11-29 RX ORDER — ATORVASTATIN CALCIUM 40 MG/1
40 TABLET, FILM COATED ORAL AT BEDTIME
Qty: 90 TABLET | Refills: 3 | Status: SHIPPED | OUTPATIENT
Start: 2021-11-29 | End: 2022-01-06

## 2021-11-29 NOTE — TELEPHONE ENCOUNTER
Cain states that he is coming in for his annual lab work.  Please place orders for this 12/2/21 lab only appointment.

## 2021-12-02 ENCOUNTER — LAB (OUTPATIENT)
Dept: LAB | Facility: CLINIC | Age: 71
End: 2021-12-02
Payer: COMMERCIAL

## 2021-12-02 DIAGNOSIS — Z12.5 PROSTATE CANCER SCREENING: ICD-10-CM

## 2021-12-02 DIAGNOSIS — E55.9 VITAMIN D DEFICIENCY: ICD-10-CM

## 2021-12-02 DIAGNOSIS — E78.00 PURE HYPERCHOLESTEROLEMIA: ICD-10-CM

## 2021-12-02 LAB
ALBUMIN SERPL-MCNC: 4 G/DL (ref 3.5–5)
ALP SERPL-CCNC: 65 U/L (ref 45–120)
ALT SERPL W P-5'-P-CCNC: 28 U/L (ref 0–45)
ANION GAP SERPL CALCULATED.3IONS-SCNC: 10 MMOL/L (ref 5–18)
AST SERPL W P-5'-P-CCNC: 23 U/L (ref 0–40)
BILIRUB SERPL-MCNC: 0.9 MG/DL (ref 0–1)
BUN SERPL-MCNC: 16 MG/DL (ref 8–22)
CALCIUM SERPL-MCNC: 9.2 MG/DL (ref 8.5–10.5)
CHLORIDE BLD-SCNC: 105 MMOL/L (ref 98–107)
CHOLEST SERPL-MCNC: 203 MG/DL
CO2 SERPL-SCNC: 26 MMOL/L (ref 22–31)
CREAT SERPL-MCNC: 0.89 MG/DL (ref 0.7–1.3)
ERYTHROCYTE [DISTWIDTH] IN BLOOD BY AUTOMATED COUNT: 12.8 % (ref 10–15)
FASTING STATUS PATIENT QL REPORTED: YES
GFR SERPL CREATININE-BSD FRML MDRD: 88 ML/MIN/1.73M2
GLUCOSE BLD-MCNC: 103 MG/DL (ref 70–125)
HCT VFR BLD AUTO: 47.4 % (ref 40–53)
HDLC SERPL-MCNC: 86 MG/DL
HGB BLD-MCNC: 15.5 G/DL (ref 13.3–17.7)
LDLC SERPL CALC-MCNC: 107 MG/DL
MCH RBC QN AUTO: 30.5 PG (ref 26.5–33)
MCHC RBC AUTO-ENTMCNC: 32.7 G/DL (ref 31.5–36.5)
MCV RBC AUTO: 93 FL (ref 78–100)
PLATELET # BLD AUTO: 233 10E3/UL (ref 150–450)
POTASSIUM BLD-SCNC: 4.7 MMOL/L (ref 3.5–5)
PROT SERPL-MCNC: 6.7 G/DL (ref 6–8)
PSA SERPL-MCNC: 1.84 UG/L (ref 0–4.5)
RBC # BLD AUTO: 5.09 10E6/UL (ref 4.4–5.9)
SODIUM SERPL-SCNC: 141 MMOL/L (ref 136–145)
TRIGL SERPL-MCNC: 49 MG/DL
TSH SERPL DL<=0.005 MIU/L-ACNC: 3.1 UIU/ML (ref 0.3–5)
WBC # BLD AUTO: 3.4 10E3/UL (ref 4–11)

## 2021-12-02 PROCEDURE — 85027 COMPLETE CBC AUTOMATED: CPT

## 2021-12-02 PROCEDURE — G0103 PSA SCREENING: HCPCS

## 2021-12-02 PROCEDURE — 82306 VITAMIN D 25 HYDROXY: CPT

## 2021-12-02 PROCEDURE — 80061 LIPID PANEL: CPT

## 2021-12-02 PROCEDURE — 80053 COMPREHEN METABOLIC PANEL: CPT

## 2021-12-02 PROCEDURE — 84443 ASSAY THYROID STIM HORMONE: CPT

## 2021-12-02 PROCEDURE — 36415 COLL VENOUS BLD VENIPUNCTURE: CPT

## 2021-12-03 LAB — DEPRECATED CALCIDIOL+CALCIFEROL SERPL-MC: 49 UG/L (ref 30–80)

## 2022-05-11 DIAGNOSIS — U07.1 INFECTION DUE TO 2019 NOVEL CORONAVIRUS: ICD-10-CM

## 2022-05-11 DIAGNOSIS — U07.1 COVID-19 VIRUS INFECTION: Primary | ICD-10-CM

## 2022-05-11 PROCEDURE — 99207 PR NO CHARGE LOS: CPT | Performed by: INTERNAL MEDICINE

## 2022-05-11 RX ORDER — BENZONATATE 200 MG/1
200 CAPSULE ORAL 3 TIMES DAILY PRN
Qty: 20 CAPSULE | Refills: 1 | Status: SHIPPED | OUTPATIENT
Start: 2022-05-11

## 2022-07-23 ENCOUNTER — HEALTH MAINTENANCE LETTER (OUTPATIENT)
Age: 72
End: 2022-07-23

## 2022-09-25 DIAGNOSIS — Z00.00 PREVENTATIVE HEALTH CARE: ICD-10-CM

## 2022-09-25 DIAGNOSIS — E78.00 PURE HYPERCHOLESTEROLEMIA: ICD-10-CM

## 2022-09-25 DIAGNOSIS — Z12.5 PROSTATE CANCER SCREENING: ICD-10-CM

## 2022-09-25 DIAGNOSIS — L57.0 ACTINIC KERATOSIS: Primary | ICD-10-CM

## 2022-09-25 RX ORDER — IMIQUIMOD 12.5 MG/.25G
CREAM TOPICAL
Qty: 8 PACKET | Refills: 3 | Status: SHIPPED | OUTPATIENT
Start: 2022-09-25

## 2022-10-01 ENCOUNTER — HEALTH MAINTENANCE LETTER (OUTPATIENT)
Age: 72
End: 2022-10-01

## 2022-10-19 DIAGNOSIS — R14.0 POSTPRANDIAL ABDOMINAL BLOATING: Primary | ICD-10-CM

## 2022-12-06 ENCOUNTER — TRANSFERRED RECORDS (OUTPATIENT)
Dept: HEALTH INFORMATION MANAGEMENT | Facility: CLINIC | Age: 72
End: 2022-12-06

## 2022-12-12 ENCOUNTER — LAB (OUTPATIENT)
Dept: LAB | Facility: CLINIC | Age: 72
End: 2022-12-12
Payer: COMMERCIAL

## 2022-12-12 DIAGNOSIS — Z12.5 PROSTATE CANCER SCREENING: ICD-10-CM

## 2022-12-12 DIAGNOSIS — Z00.00 PREVENTATIVE HEALTH CARE: ICD-10-CM

## 2022-12-12 DIAGNOSIS — R14.0 POSTPRANDIAL ABDOMINAL BLOATING: ICD-10-CM

## 2022-12-12 DIAGNOSIS — E78.00 PURE HYPERCHOLESTEROLEMIA: ICD-10-CM

## 2022-12-12 LAB
ALBUMIN SERPL BCG-MCNC: 4.4 G/DL (ref 3.5–5.2)
ALP SERPL-CCNC: 77 U/L (ref 40–129)
ALT SERPL W P-5'-P-CCNC: 29 U/L (ref 10–50)
ANION GAP SERPL CALCULATED.3IONS-SCNC: 11 MMOL/L (ref 7–15)
AST SERPL W P-5'-P-CCNC: 33 U/L (ref 10–50)
BASOPHILS # BLD AUTO: 0 10E3/UL (ref 0–0.2)
BASOPHILS NFR BLD AUTO: 1 %
BILIRUB SERPL-MCNC: 0.5 MG/DL
BUN SERPL-MCNC: 20.7 MG/DL (ref 8–23)
CALCIUM SERPL-MCNC: 9.1 MG/DL (ref 8.8–10.2)
CHLORIDE SERPL-SCNC: 105 MMOL/L (ref 98–107)
CHOLEST SERPL-MCNC: 188 MG/DL
CREAT SERPL-MCNC: 0.92 MG/DL (ref 0.67–1.17)
DEPRECATED HCO3 PLAS-SCNC: 25 MMOL/L (ref 22–29)
EOSINOPHIL # BLD AUTO: 0.1 10E3/UL (ref 0–0.7)
EOSINOPHIL NFR BLD AUTO: 3 %
ERYTHROCYTE [DISTWIDTH] IN BLOOD BY AUTOMATED COUNT: 13 % (ref 10–15)
GFR SERPL CREATININE-BSD FRML MDRD: 90 ML/MIN/1.73M2
GLUCOSE SERPL-MCNC: 117 MG/DL (ref 70–99)
HCT VFR BLD AUTO: 45.7 % (ref 40–53)
HDLC SERPL-MCNC: 86 MG/DL
HGB BLD-MCNC: 15 G/DL (ref 13.3–17.7)
IMM GRANULOCYTES # BLD: 0 10E3/UL
IMM GRANULOCYTES NFR BLD: 0 %
LDLC SERPL CALC-MCNC: 91 MG/DL
LYMPHOCYTES # BLD AUTO: 0.8 10E3/UL (ref 0.8–5.3)
LYMPHOCYTES NFR BLD AUTO: 21 %
MCH RBC QN AUTO: 30.5 PG (ref 26.5–33)
MCHC RBC AUTO-ENTMCNC: 32.8 G/DL (ref 31.5–36.5)
MCV RBC AUTO: 93 FL (ref 78–100)
MONOCYTES # BLD AUTO: 0.7 10E3/UL (ref 0–1.3)
MONOCYTES NFR BLD AUTO: 18 %
NEUTROPHILS # BLD AUTO: 2.3 10E3/UL (ref 1.6–8.3)
NEUTROPHILS NFR BLD AUTO: 58 %
NONHDLC SERPL-MCNC: 102 MG/DL
PLATELET # BLD AUTO: 213 10E3/UL (ref 150–450)
POTASSIUM SERPL-SCNC: 4.3 MMOL/L (ref 3.4–5.3)
PROT SERPL-MCNC: 6.9 G/DL (ref 6.4–8.3)
PSA SERPL-MCNC: 2.21 NG/ML (ref 0–4.5)
RBC # BLD AUTO: 4.91 10E6/UL (ref 4.4–5.9)
SODIUM SERPL-SCNC: 141 MMOL/L (ref 136–145)
TRIGL SERPL-MCNC: 53 MG/DL
TSH SERPL DL<=0.005 MIU/L-ACNC: 3.43 UIU/ML (ref 0.3–4.2)
WBC # BLD AUTO: 3.9 10E3/UL (ref 4–11)

## 2022-12-12 PROCEDURE — 36415 COLL VENOUS BLD VENIPUNCTURE: CPT

## 2022-12-12 PROCEDURE — G0103 PSA SCREENING: HCPCS

## 2022-12-12 PROCEDURE — 80050 GENERAL HEALTH PANEL: CPT

## 2022-12-12 PROCEDURE — 80061 LIPID PANEL: CPT

## 2022-12-12 PROCEDURE — 86364 TISS TRNSGLTMNASE EA IG CLAS: CPT

## 2022-12-13 LAB
TTG IGA SER-ACNC: 1 U/ML
TTG IGG SER-ACNC: 1.3 U/ML

## 2022-12-26 DIAGNOSIS — E78.00 PURE HYPERCHOLESTEROLEMIA: ICD-10-CM

## 2022-12-27 RX ORDER — ATORVASTATIN CALCIUM 40 MG/1
TABLET, FILM COATED ORAL
Qty: 90 TABLET | Refills: 2 | Status: SHIPPED | OUTPATIENT
Start: 2022-12-27 | End: 2023-09-14

## 2022-12-27 NOTE — TELEPHONE ENCOUNTER
"Routing refill request to provider for review/approval because:  Patient needs to be seen because it has been more than 1 year since last office visit.    Last Written Prescription Date:  1/6/22  Last Fill Quantity: 90,  # refills: 2   Last office visit provider:  7/25/2019     Requested Prescriptions   Pending Prescriptions Disp Refills     atorvastatin (LIPITOR) 40 MG tablet [Pharmacy Med Name: ATORVASTATIN CALCIUM 40MG TABS] 90 tablet 2     Sig: TAKE ONE TABLET BY MOUTH EVERY NIGHT AT BEDTIME       Statins Protocol Failed - 12/26/2022 10:38 AM        Failed - Recent (12 mo) or future (30 days) visit within the authorizing provider's specialty     Patient has had an office visit with the authorizing provider or a provider within the authorizing providers department within the previous 12 mos or has a future within next 30 days. See \"Patient Info\" tab in inbasket, or \"Choose Columns\" in Meds & Orders section of the refill encounter.              Passed - LDL on file in past 12 months     Recent Labs   Lab Test 12/12/22  0910   LDL 91             Passed - No abnormal creatine kinase in past 12 months     No lab results found.             Passed - Medication is active on med list        Passed - Patient is age 18 or older             Althea Monteiro, RN 12/27/22 12:48 PM  "

## 2023-01-06 DIAGNOSIS — J01.00 ACUTE NON-RECURRENT MAXILLARY SINUSITIS: Primary | ICD-10-CM

## 2023-01-06 RX ORDER — AMOXICILLIN AND CLAVULANATE POTASSIUM 500; 125 MG/1; MG/1
1 TABLET, FILM COATED ORAL 2 TIMES DAILY
Qty: 20 TABLET | Refills: 0 | Status: SHIPPED | OUTPATIENT
Start: 2023-01-06 | End: 2023-01-16

## 2023-01-06 NOTE — PROGRESS NOTES
Per text communication notified of symptoms of bacterial sinusitis.  Prescription for Augmentin sent

## 2023-05-08 DIAGNOSIS — E55.9 VITAMIN D DEFICIENCY: ICD-10-CM

## 2023-05-09 NOTE — TELEPHONE ENCOUNTER
"Routing refill request to provider for review/approval because:  A break in medication  Patient needs to be seen because it has been more than 3 years since last office visit.    Last Written Prescription Date:  1/6/22  Last Fill Quantity: 90,  # refills: 3   Last office visit provider:  7/25/2019     Requested Prescriptions   Pending Prescriptions Disp Refills     Vitamin D3 (CHOLECALCIFEROL) 125 MCG (5000 UT) tablet [Pharmacy Med Name: VITAMIN D3 125 MCG(5000 UT) TABS] 90 tablet 3     Sig: TAKE ONE TABLET BY MOUTH ONCE DAILY       Vitamin Supplements (Adult) Protocol Failed - 5/9/2023  9:14 AM        Failed - Recent (12 mo) or future (30 days) visit within the authorizing provider's specialty     Patient has had an office visit with the authorizing provider or a provider within the authorizing providers department within the previous 12 mos or has a future within next 30 days. See \"Patient Info\" tab in inbasket, or \"Choose Columns\" in Meds & Orders section of the refill encounter.              Passed - High dose Vitamin D not ordered        Passed - Medication is active on med list             Dimitrios Martinez RN 05/09/23 9:15 AM  "

## 2023-08-12 ENCOUNTER — HEALTH MAINTENANCE LETTER (OUTPATIENT)
Age: 73
End: 2023-08-12

## 2023-08-15 DIAGNOSIS — E55.9 VITAMIN D DEFICIENCY: ICD-10-CM

## 2023-08-16 NOTE — TELEPHONE ENCOUNTER
"Routing refill request to provider for review/approval because:  Patient needs to be seen because it has been more than 1 year since last office visit.    Last Written Prescription Date:  5/9/2023  Last Fill Quantity: 90,  # refills: 0   Last office visit provider:  12/23/2020     Requested Prescriptions   Pending Prescriptions Disp Refills    Vitamin D3 (CHOLECALCIFEROL) 125 MCG (5000 UT) tablet [Pharmacy Med Name: VITAMIN D3 125 MCG(5000 UT) TABS] 90 tablet 0     Sig: TAKE ONE TABLET BY MOUTH ONCE DAILY       Vitamin Supplements (Adult) Protocol Failed - 8/16/2023 10:10 AM        Failed - Recent (12 mo) or future (30 days) visit within the authorizing provider's specialty     Patient has had an office visit with the authorizing provider or a provider within the authorizing providers department within the previous 12 mos or has a future within next 30 days. See \"Patient Info\" tab in inbasket, or \"Choose Columns\" in Meds & Orders section of the refill encounter.              Passed - High dose Vitamin D not ordered        Passed - Medication is active on med list             Yunier Ragsdale RN 08/16/23 10:10 AM  "

## 2023-09-14 DIAGNOSIS — E78.00 PURE HYPERCHOLESTEROLEMIA: ICD-10-CM

## 2023-09-14 RX ORDER — ATORVASTATIN CALCIUM 40 MG/1
TABLET, FILM COATED ORAL
Qty: 90 TABLET | Refills: 2 | Status: SHIPPED | OUTPATIENT
Start: 2023-09-14 | End: 2024-06-03

## 2023-09-21 NOTE — PROGRESS NOTES
Positive for COVID today September 21.  Symptoms of pharyngitis, rhinorrhea and cough.  Advised against Paxlovid. MASSBP score of 2.  Symptomatic medicines if needed

## 2023-12-13 DIAGNOSIS — E78.00 PURE HYPERCHOLESTEROLEMIA: Primary | ICD-10-CM

## 2023-12-13 DIAGNOSIS — Z12.5 PROSTATE CANCER SCREENING: ICD-10-CM

## 2023-12-13 NOTE — PROGRESS NOTES
Reviewed medications.  Atorvastatin and aspirin.  Other medicines as needed    Reviewed and ordered labs    Colonoscopy due 2024

## 2023-12-18 ENCOUNTER — LAB (OUTPATIENT)
Dept: LAB | Facility: CLINIC | Age: 73
End: 2023-12-18
Payer: COMMERCIAL

## 2023-12-18 DIAGNOSIS — Z12.5 PROSTATE CANCER SCREENING: ICD-10-CM

## 2023-12-18 DIAGNOSIS — E78.00 PURE HYPERCHOLESTEROLEMIA: ICD-10-CM

## 2023-12-18 LAB
ALBUMIN SERPL BCG-MCNC: 4.4 G/DL (ref 3.5–5.2)
ALP SERPL-CCNC: 66 U/L (ref 40–150)
ALT SERPL W P-5'-P-CCNC: 25 U/L (ref 0–70)
ANION GAP SERPL CALCULATED.3IONS-SCNC: 7 MMOL/L (ref 7–15)
AST SERPL W P-5'-P-CCNC: 28 U/L (ref 0–45)
BASOPHILS # BLD AUTO: 0 10E3/UL (ref 0–0.2)
BASOPHILS NFR BLD AUTO: 1 %
BILIRUB SERPL-MCNC: 0.6 MG/DL
BUN SERPL-MCNC: 16 MG/DL (ref 8–23)
CALCIUM SERPL-MCNC: 8.7 MG/DL (ref 8.8–10.2)
CHLORIDE SERPL-SCNC: 107 MMOL/L (ref 98–107)
CHOLEST SERPL-MCNC: 178 MG/DL
CREAT SERPL-MCNC: 0.87 MG/DL (ref 0.67–1.17)
DEPRECATED HCO3 PLAS-SCNC: 27 MMOL/L (ref 22–29)
EGFRCR SERPLBLD CKD-EPI 2021: >90 ML/MIN/1.73M2
EOSINOPHIL # BLD AUTO: 0.2 10E3/UL (ref 0–0.7)
EOSINOPHIL NFR BLD AUTO: 4 %
ERYTHROCYTE [DISTWIDTH] IN BLOOD BY AUTOMATED COUNT: 12.9 % (ref 10–15)
FASTING STATUS PATIENT QL REPORTED: YES
GLUCOSE SERPL-MCNC: 115 MG/DL (ref 70–99)
HCT VFR BLD AUTO: 44.4 % (ref 40–53)
HDLC SERPL-MCNC: 89 MG/DL
HGB BLD-MCNC: 15.1 G/DL (ref 13.3–17.7)
IMM GRANULOCYTES # BLD: 0 10E3/UL
IMM GRANULOCYTES NFR BLD: 0 %
LDLC SERPL CALC-MCNC: 79 MG/DL
LYMPHOCYTES # BLD AUTO: 1.2 10E3/UL (ref 0.8–5.3)
LYMPHOCYTES NFR BLD AUTO: 26 %
MCH RBC QN AUTO: 31.9 PG (ref 26.5–33)
MCHC RBC AUTO-ENTMCNC: 34 G/DL (ref 31.5–36.5)
MCV RBC AUTO: 94 FL (ref 78–100)
MONOCYTES # BLD AUTO: 0.6 10E3/UL (ref 0–1.3)
MONOCYTES NFR BLD AUTO: 14 %
NEUTROPHILS # BLD AUTO: 2.5 10E3/UL (ref 1.6–8.3)
NEUTROPHILS NFR BLD AUTO: 55 %
NONHDLC SERPL-MCNC: 89 MG/DL
PLATELET # BLD AUTO: 260 10E3/UL (ref 150–450)
POTASSIUM SERPL-SCNC: 4.6 MMOL/L (ref 3.4–5.3)
PROT SERPL-MCNC: 6.9 G/DL (ref 6.4–8.3)
PSA SERPL DL<=0.01 NG/ML-MCNC: 2.44 NG/ML (ref 0–6.5)
RBC # BLD AUTO: 4.74 10E6/UL (ref 4.4–5.9)
SODIUM SERPL-SCNC: 141 MMOL/L (ref 135–145)
TRIGL SERPL-MCNC: 51 MG/DL
TSH SERPL DL<=0.005 MIU/L-ACNC: 3.36 UIU/ML (ref 0.3–4.2)
WBC # BLD AUTO: 4.6 10E3/UL (ref 4–11)

## 2023-12-18 PROCEDURE — 85025 COMPLETE CBC W/AUTO DIFF WBC: CPT

## 2023-12-18 PROCEDURE — 80053 COMPREHEN METABOLIC PANEL: CPT

## 2023-12-18 PROCEDURE — G0103 PSA SCREENING: HCPCS

## 2023-12-18 PROCEDURE — 80061 LIPID PANEL: CPT

## 2023-12-18 PROCEDURE — 36415 COLL VENOUS BLD VENIPUNCTURE: CPT

## 2023-12-18 PROCEDURE — 84443 ASSAY THYROID STIM HORMONE: CPT

## 2024-06-02 DIAGNOSIS — E78.00 PURE HYPERCHOLESTEROLEMIA: ICD-10-CM

## 2024-06-03 RX ORDER — ATORVASTATIN CALCIUM 40 MG/1
TABLET, FILM COATED ORAL
Qty: 90 TABLET | Refills: 2 | Status: SHIPPED | OUTPATIENT
Start: 2024-06-03

## 2024-09-29 ENCOUNTER — HEALTH MAINTENANCE LETTER (OUTPATIENT)
Age: 74
End: 2024-09-29

## 2024-12-05 ENCOUNTER — TRANSFERRED RECORDS (OUTPATIENT)
Dept: HEALTH INFORMATION MANAGEMENT | Facility: CLINIC | Age: 74
End: 2024-12-05
Payer: COMMERCIAL

## 2024-12-09 PROBLEM — D12.6 ADENOMA OF COLON: Status: ACTIVE | Noted: 2024-12-09

## 2024-12-10 DIAGNOSIS — E78.00 PURE HYPERCHOLESTEROLEMIA: Primary | ICD-10-CM

## 2024-12-10 DIAGNOSIS — Z12.5 PROSTATE CANCER SCREENING: ICD-10-CM

## 2024-12-14 DIAGNOSIS — N41.0 PROSTATITIS, ACUTE: Primary | ICD-10-CM

## 2024-12-14 DIAGNOSIS — N41.0 PROSTATITIS, ACUTE: ICD-10-CM

## 2024-12-14 RX ORDER — CIPROFLOXACIN 500 MG/1
500 TABLET, FILM COATED ORAL 2 TIMES DAILY
Qty: 14 TABLET | Refills: 0 | Status: SHIPPED | OUTPATIENT
Start: 2024-12-14

## 2024-12-14 RX ORDER — CIPROFLOXACIN 500 MG/1
500 TABLET, FILM COATED ORAL 2 TIMES DAILY
Qty: 14 TABLET | Refills: 0 | Status: SHIPPED | OUTPATIENT
Start: 2024-12-14 | End: 2024-12-14

## 2025-01-10 PROBLEM — J18.9 COMMUNITY ACQUIRED PNEUMONIA, UNSPECIFIED LATERALITY: Status: ACTIVE | Noted: 2025-01-10

## 2025-01-14 DIAGNOSIS — J10.1 INFLUENZA A: Primary | ICD-10-CM

## 2025-01-14 RX ORDER — OSELTAMIVIR PHOSPHATE 75 MG/1
75 CAPSULE ORAL DAILY
Qty: 7 CAPSULE | Refills: 0 | Status: SHIPPED | OUTPATIENT
Start: 2025-01-14 | End: 2025-01-14

## 2025-01-14 RX ORDER — OSELTAMIVIR PHOSPHATE 75 MG/1
75 CAPSULE ORAL 2 TIMES DAILY
Qty: 10 CAPSULE | Refills: 0 | Status: SHIPPED | OUTPATIENT
Start: 2025-01-14 | End: 2025-01-19

## 2025-02-17 DIAGNOSIS — E78.00 PURE HYPERCHOLESTEROLEMIA: ICD-10-CM

## 2025-02-17 RX ORDER — ATORVASTATIN CALCIUM 40 MG/1
TABLET, FILM COATED ORAL
Qty: 90 TABLET | Refills: 3 | Status: SHIPPED | OUTPATIENT
Start: 2025-02-17